# Patient Record
Sex: FEMALE | URBAN - METROPOLITAN AREA
[De-identification: names, ages, dates, MRNs, and addresses within clinical notes are randomized per-mention and may not be internally consistent; named-entity substitution may affect disease eponyms.]

---

## 2017-01-10 ENCOUNTER — VIRTUAL VISIT (OUTPATIENT)
Dept: FAMILY MEDICINE | Facility: OTHER | Age: 52
End: 2017-01-10

## 2017-01-10 NOTE — PROGRESS NOTES
"Date:   Clinician: Joel Wegener  Clinician NPI: [id number]  Patient: Aneta Trinidad  Patient : 1965  Patient Address: [address] [place] [address] - [address] [address]  Patient Phone: [phone]  Visit Protocol: URI  Patient Summary:  Aneta is a 51 year old ( : 1965 ) female who initiated a Zip for a presumed sinus infection. When asked the question \"Do you have a [place] primary care physician?\", Aneta responded \"I don't know\".     Her  symptoms started gradually 3 weeks ago  and consist of malaise, nasal congestion, ear pain, post-nasal drainage, itchy eyes, and rhinitis.   She denies dysphagia, nausea, vomiting, fever, cough, myalgias, chest pain, dyspnea, loss of appetite, chills, sore throat, and hoarse voice. She denies a history of facial surgery.   Her moderate nasal secretions are green. Her mild facial pain or pressure feels worse when bending over or leaning forward and is located on both sides of her head. The facial pain or pressure started after the onset of other URI symptoms. She has teeth pain and is confident the tooth pain is not from a cavity, recent dental work or other mouth problems. Aneta has a mild headache. The headache did not start before her other symptoms and is located on both sides of her head. When asked to feel her neck she denied feeling enlarged lymph nodes. She denies axillary lymphadenopathy.   Regarding the ear pain, the patient denies recent injury to the area around the ear, experiencing pain when gently pulling on the earlobe, pain if the mouth is fully open or teeth are clenched, mastoid tenderness, and tinnitus.   She reports having mild ear pain on the ear canal area of both ears for 5-7 days. The patient hears normally despite the ear pain.   Aneta reports having a feeling of fullness in the ear as if it is clogged on the following locations: Ear Canal.   Aneta denies having redness, swelling, and tenderness on her ear.   Additionally, she " does not experience pain when bending the chin to the chest.   She has never had tympanostomy tube placement.    Her highest temperature was 98.6 degrees Fahrenheit. Her current temperature is 98.6 degrees Fahrenheit. Aneta has had a fever for 1 - 2 days and used the oral method for measuring her temperature.   She has passed urine in the past 12 hours.   Aneta denies having COPD or other chronic lung disease.   Pulse: Not measured beats in 10 seconds.   Current Temperature (F): 98.6    Weight (in lbs): 180.0   She states she is not pregnant and denies breastfeeding. Her last period was over a month ago and her periods are typically regular. Despite that, she states it is not possible for her to be pregnant.   Aneta does not smoke or use smokeless tobacco.   MEDICATIONS:  Blood thinner  , ALLERGIES:  NKDA   Clinician Response:  Dear Aneta,  Based on the information you have provided, you likely have  acute sinusitis, otherwise known as a sinus infection.   I am prescribing amoxicillin-Pot Clavulanate [Augmentin] 875-125mg. Take one tablet by mouth two times a day for 10 days. There are no refills with this prescription.   Sinus pressure occurs when the tissues lining your sinuses become swollen and inflamed. Afrin nasal spray decreases the swelling to provide the quickest and most effective relief from sinus pressure.  Use oxymetazoline (Afrin, or store brand) nasal spray. Spray once in each nostril twice per day for a maximum of 3 days. Using this medication more frequently or longer than recommended may cause nasal congestion to reoccur or worsen. This is an over-the-counter medication you can find at most pharmacies.   Unless your are allergic to the over-the-counter medication(s) below, I recommend using:   A decongestant such as pseudoephedrine (Sudafed or store brand) to help your symptoms. This is an over-the-counter medication that does not require a prescription.   Ibuprofen. Take 1-3 200mg tablets  (200-600mg) every 8 hours to help with the discomfort. Make sure to take the ibuprofen with food. Do not exceed 2400mg in 24 hours. This is an over-the-counter medication that does not require a prescription.   Drink plenty of liquids, especially water and take time to rest your body. This may mean taking a nap or going to bed earlier. Your body is fighting an infection and liquids and rest will improve the pace of recovery. Remember to regularly wash your hands and avoid close contact with others to prevent spreading your infection.   Some people develop allergies to antibiotics. If you notice a new rash, significant swelling or difficulty breathing, stop the medication immediately and go into a clinic for physical evaluation.   Some women may also develop a yeast infection as a side effect of taking antibiotics. If you notice symptoms of a yeast infection, please use [facility] to get treatment.   If you become pregnant during this course of treatment with medication, stop taking the medication and contact your primary care clinician.   Diagnosis: Acute Sinusitis  Diagnosis ICD: J01.90  Prescription: amoxicillin-Pot Clavulanate (Augmentin) 875-125 mg oral tablet 20 tablets, 10 days supply. Take one tablet by mouth two times a day for 10 days. Refills: 0, Refill as needed: no, Allow substitutions: yes